# Patient Record
Sex: FEMALE | Race: WHITE | HISPANIC OR LATINO | URBAN - METROPOLITAN AREA
[De-identification: names, ages, dates, MRNs, and addresses within clinical notes are randomized per-mention and may not be internally consistent; named-entity substitution may affect disease eponyms.]

---
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
## 2023-02-15 NOTE — DISCHARGE INSTRUCTIONS
Fall Prevention in the Home   Falls can cause injuries and can affect people from all age groups. There are many simple things that you can do to make your home safe and to help prevent falls.  WHAT CAN I DO ON THE OUTSIDE OF MY HOME?  · Regularly repair the edges of walkways and driveways and fix any cracks.  · Remove high doorway thresholds.  · Trim any shrubbery on the main path into your home.  · Use bright outdoor lighting.  · Clear walkways of debris and clutter, including tools and rocks.  · Regularly check that handrails are securely fastened and in good repair. Both sides of any steps should have handrails.  · Install guardrails along the edges of any raised decks or porches.  · Have leaves, snow, and ice cleared regularly.  · Use sand or salt on walkways during winter months.  · In the garage, clean up any spills right away, including grease or oil spills.  WHAT CAN I DO IN THE BATHROOM?  · Use night lights.  · Install grab bars by the toilet and in the tub and shower. Do not use towel bars as grab bars.  · Use non-skid mats or decals on the floor of the tub or shower.  · If you need to sit down while you are in the shower, use a plastic, non-slip stool..  · Keep the floor dry. Immediately clean up any water that spills on the floor.  · Remove soap buildup in the tub or shower on a regular basis.  · Attach bath mats securely with double-sided non-slip rug tape.  · Remove throw rugs and other tripping hazards from the floor.  WHAT CAN I DO IN THE BEDROOM?  · Use night lights.  · Make sure that a bedside light is easy to reach.  · Do not use oversized bedding that drapes onto the floor.  · Have a firm chair that has side arms to use for getting dressed.  · Remove throw rugs and other tripping hazards from the floor.  WHAT CAN I DO IN THE KITCHEN?   · Clean up any spills right away.  · Avoid walking on wet floors.  · Place frequently used items in easy-to-reach places.  · If you need to reach for something  above you, use a sturdy step stool that has a grab bar.  · Keep electrical cables out of the way.  · Do not use floor polish or wax that makes floors slippery. If you have to use wax, make sure that it is non-skid floor wax.  · Remove throw rugs and other tripping hazards from the floor.  WHAT CAN I DO IN THE STAIRWAYS?  · Do not leave any items on the stairs.  · Make sure that there are handrails on both sides of the stairs. Fix handrails that are broken or loose. Make sure that handrails are as long as the stairways.  · Check any carpeting to make sure that it is firmly attached to the stairs. Fix any carpet that is loose or worn.  · Avoid having throw rugs at the top or bottom of stairways, or secure the rugs with carpet tape to prevent them from moving.  · Make sure that you have a light switch at the top of the stairs and the bottom of the stairs. If you do not have them, have them installed.  WHAT ARE SOME OTHER FALL PREVENTION TIPS?  · Wear closed-toe shoes that fit well and support your feet. Wear shoes that have rubber soles or low heels.  · When you use a stepladder, make sure that it is completely opened and that the sides are firmly locked. Have someone hold the ladder while you are using it. Do not climb a closed stepladder.  · Add color or contrast paint or tape to grab bars and handrails in your home. Place contrasting color strips on the first and last steps.  · Use mobility aids as needed, such as canes, walkers, scooters, and crutches.  · Turn on lights if it is dark. Replace any light bulbs that burn out.  · Set up furniture so that there are clear paths. Keep the furniture in the same spot.  · Fix any uneven floor surfaces.  · Choose a carpet design that does not hide the edge of steps of a stairway.  · Be aware of any and all pets.  · Review your medicines with your healthcare provider. Some medicines can cause dizziness or changes in blood pressure, which increase your risk of falling.  Talk  with your health care provider about other ways that you can decrease your risk of falls. This may include working with a physical therapist or  to improve your strength, balance, and endurance.     This information is not intended to replace advice given to you by your health care provider. Make sure you discuss any questions you have with your health care provider.     Document Released: 12/08/2003 Document Revised: 05/03/2016 Document Reviewed: 01/22/2016  [employer] Interactive Patient Education ©2016 [employer]    Head Injury, Pediatric  Your child has a head injury. Headaches and throwing up (vomiting) are common after a head injury. It should be easy to wake your child up from sleeping. Sometimes your child must stay in the hospital. Most problems happen within the first 24 hours. Side effects may occur up to 7-10 days after the injury.   WHAT ARE THE TYPES OF HEAD INJURIES?  Head injuries can be as minor as a bump. Some head injuries can be more severe. More severe head injuries include:  · A jarring injury to the brain (concussion).  · A bruise of the brain (contusion). This mean there is bleeding in the brain that can cause swelling.  · A cracked skull (skull fracture).  · Bleeding in the brain that collects, clots, and forms a bump (hematoma).  WHEN SHOULD I GET HELP FOR MY CHILD RIGHT AWAY?   · Your child is not making sense when talking.  · Your child is sleepier than normal or passes out (faints).  · Your child feels sick to his or her stomach (nauseous) or throws up (vomits) many times.  · Your child is dizzy.  · Your child has a lot of bad headaches that are not helped by medicine. Only give medicines as told by your child's doctor. Do not give your child aspirin.  · Your child has trouble using his or her legs.  · Your child has trouble walking.  · Your child's pupils (the black circles in the center of the eyes) change in size.  · Your child has clear or bloody fluid coming from his or her  nose or ears.  · Your child has problems seeing.  Call for help right away (911 in the [place] if your child shakes and is not able to control it (has seizures), is unconscious, or is unable to wake up.  HOW CAN I PREVENT MY CHILD FROM HAVING A HEAD INJURY IN THE FUTURE?  · Make sure your child wears seat belts or uses car seats.  · Make sure your child wears a helmet while bike riding and playing sports like football.  · Make sure your child stays away from dangerous activities around the house.  WHEN CAN MY CHILD RETURN TO NORMAL ACTIVITIES AND ATHLETICS?  See your doctor before letting your child do these activities. Your child should not do normal activities or play contact sports until 1 week after the following symptoms have stopped:  · Headache that does not go away.  · Dizziness.  · Poor attention.  · Confusion.  · Memory problems.  · Sickness to your stomach or throwing up.  · Tiredness.  · Fussiness.  · Bothered by bright lights or loud noises.  · Anxiousness or depression.  · Restless sleep.  MAKE SURE YOU:   · Understand these instructions.  · Will watch your child's condition.  · Will get help right away if your child is not doing well or gets worse.     This information is not intended to replace advice given to you by your health care provider. Make sure you discuss any questions you have with your health care provider.     Document Released: 06/05/2009 Document Revised: 01/08/2016 Document Reviewed: 08/25/2014  "[employer]" Interactive Patient Education ©2016 "[employer]    Laceration Care, Pediatric  A laceration is a cut that goes through all of the layers of the skin. The cut also goes into the tissue that is under the skin. Some cuts heal on their own. Others need to be closed with stitches (sutures), staples, skin adhesive strips, or wound glue. Taking care of your child's cut lowers your child's risk of infection and helps your child's cut to heal better.  HOW TO CARE FOR YOUR CHILD'S CUT  If  stitches or staples were used:  · Keep the wound clean and dry.  · If your child was given a bandage (dressing), change it at least one time per day or as told by your child's doctor. You should also change it if it gets wet or dirty.  · Keep the wound completely dry for the first 24 hours or as told by your child's doctor. After that time, your child may shower or bathe. However, make sure that the wound is not soaked in water until the stitches or staples have been removed.  · Clean the wound one time each day or as told by your child's doctor.  ¨ Wash the wound with soap and water.  ¨ Rinse the wound with water to remove all soap.  ¨ Pat the wound dry with a clean towel. Do not rub the wound.  · After cleaning the wound, put a thin layer of antibiotic ointment on it as told by your child's doctor. This ointment:  ¨ Helps to prevent infection.  ¨ Keeps the bandage from sticking to the wound.  · Have the stitches or staples removed as told by your child's doctor.  If skin adhesive strips were used:  · Keep the wound clean and dry.  · If your child was given a bandage (dressing), you should change it at least once per day or told by your child's doctor. You should also change it if it gets dirty or wet.  · Do not let the skin adhesive strips get wet. Your child may shower or bathe, but be careful to keep the wound dry.  · If the wound gets wet, pat it dry with a clean towel. Do not rub the wound.  · Skin adhesive strips fall off on their own. You can trim the strips as the wound heals. Do not take off the skin adhesive strips that are still stuck to the wound. They will fall off in time.  If wound glue was used:  · Try to keep the wound dry, but your child may briefly wet it in the shower or bath. Do not allow the wound to be soaked in water, such as by swimming.  · After your child has showered or bathed, gently pat the wound dry with a clean towel. Do not rub the wound.  · Do not allow your child to do any  activities that will make him or her sweat a lot until the skin glue has fallen off on its own.  · Do not apply liquid, cream, or ointment medicine to your child's wound while the skin glue is in place.  · If your child was given a bandage (dressing), you should change it at least once per day or as told by your child's doctor. You should also change it if it gets dirty or wet.  · If a bandage is placed over the wound, do not put tape right on top of the skin glue.  · Do not let your child pick at the glue. The skin glue usually stays in place for 5-10 days. Then, it falls off of the skin.   General Instructions  · Give medicines only as told by your child's doctor.  · To help prevent scarring, make sure to cover your child's wound with sunscreen whenever he or she is outside after stitches are removed, after adhesive strips are removed, or when glue stays in place and the wound is healed. Make sure your child wears a sunscreen of at least 30 SPF.  · If your child was prescribed an antibiotic medicine or ointment, have him or her finish all of it even if your child starts to feel better.  · Do not let your child scratch or pick at the wound.  · Keep all follow-up visits as told by your child's doctor. This is important.  · Check your child's wound every day for signs of infection. Watch for:  ¨ Redness, swelling, or pain.  ¨ Fluid, blood, or pus.  · Have your child raise (elevate) the injured area above the level of his or her heart while he or she is sitting or lying down, if possible.  GET HELP IF:  · Your child was given a tetanus shot and has any of these where the needle went in:  ¨ Swelling.  ¨ Very bad pain.  ¨ Redness.  ¨ Bleeding.  · Your child has a fever.  · A wound that was closed breaks open.  · You notice a bad smell coming from the wound.  · You notice something coming out of the wound, such as wood or glass.  · Medicine does not help your child's pain.  · Your child has any of these at the site of  the wound:  ¨ More redness.  ¨ More swelling.  ¨ More pain.  · Your child has any of these coming from the wound.  ¨ Fluid.  ¨ Blood.  ¨ Pus.  · You notice a change in the color of your child's skin near the wound.  · You need to change the bandage often due to fluid, blood, or pus coming from the wound.  · Your child has a new rash.  · Your child has numbness around the wound.  GET HELP RIGHT AWAY IF:  · Your child has very bad swelling around the wound.  · Your child's pain suddenly gets worse and is very bad.  · Your child has painful lumps near the wound or on skin that is anywhere on his or her body.  · Your child has a red streak going away from his or her wound.  · The wound is on your child's hand or foot and he or she cannot move a finger or toe like normal.  · The wound is on your child's hand or foot and you notice that his or her fingers or toes look pale or bluish.  · Your child who is younger than 3 months has a temperature of 100°F (38°C) or higher.     This information is not intended to replace advice given to you by your health care provider. Make sure you discuss any questions you have with your health care provider.     Document Released: 09/26/2009 Document Revised: 05/03/2016 Document Reviewed: 12/14/2015  [employer] Interactive Patient Education ©2016 [employer]      Staples out in 5 days   Yes

## 2023-12-31 ENCOUNTER — HOSPITAL ENCOUNTER (EMERGENCY)
Facility: MEDICAL CENTER | Age: 23
End: 2024-01-01
Attending: EMERGENCY MEDICINE
Payer: MEDICAID

## 2023-12-31 VITALS
WEIGHT: 180 LBS | TEMPERATURE: 97.2 F | RESPIRATION RATE: 17 BRPM | BODY MASS INDEX: 27.28 KG/M2 | SYSTOLIC BLOOD PRESSURE: 132 MMHG | HEIGHT: 68 IN | DIASTOLIC BLOOD PRESSURE: 99 MMHG | OXYGEN SATURATION: 96 % | HEART RATE: 100 BPM

## 2023-12-31 DIAGNOSIS — F10.920 ALCOHOLIC INTOXICATION WITHOUT COMPLICATION (HCC): ICD-10-CM

## 2023-12-31 LAB — GLUCOSE BLD STRIP.AUTO-MCNC: 136 MG/DL (ref 65–99)

## 2023-12-31 PROCEDURE — 99284 EMERGENCY DEPT VISIT MOD MDM: CPT

## 2023-12-31 PROCEDURE — 82962 GLUCOSE BLOOD TEST: CPT

## 2024-01-01 NOTE — ED TRIAGE NOTES
Chief Complaint   Patient presents with    Alcohol Intoxication     Possible ETOH      Pt found outside by herself. Pt possible alcohol intoxicated. Pt altered AAOx 1 with EMS and refused to check in ER so pt forced to get in ambulance by [facility]. Pt denies any alcohol and drug use. Pt still confused and AAOx1.

## 2024-01-01 NOTE — ED NOTES
ERP at bedside, ERP explain to the patient to call someone who can get her ride, pt agreed to call family member.

## 2024-01-01 NOTE — ED PROVIDER NOTES
"ED Provider Note    CHIEF COMPLAINT  Chief Complaint   Patient presents with    Alcohol Intoxication     Possible ETOH        EXTERNAL RECORDS REVIEWED  Inpatient Notes remote ER visit in 2018 the patient present with bodyaches    HPI/ROS  LIMITATION TO HISTORY   Select: Intoxication  OUTSIDE HISTORIAN(S):  None    Tamela Flores is a 23 y.o. female who presents to the emergency department with reported alcohol intoxication.  Patient does admit to drinking multiple alcoholic beverages while celebrating New Year's at the [facility] with her friends.  Cannot recall how she got here to the hospital.  Denies any other substance intake.  Denies any past medical history.  Denies any medications.  Denies any illicit substance.     PAST MEDICAL HISTORY       SURGICAL HISTORY  patient denies any surgical history    FAMILY HISTORY  No family history on file.    SOCIAL HISTORY  Social History     Tobacco Use    Smoking status: Never    Smokeless tobacco: Never   Substance and Sexual Activity    Alcohol use: No    Drug use: No    Sexual activity: Not on file       CURRENT MEDICATIONS  Home Medications    **Home medications have not yet been reviewed for this encounter**         ALLERGIES  No Known Allergies    PHYSICAL EXAM  VITAL SIGNS: BP (!) 132/99   Pulse 100   Temp 36.2 °C (97.2 °F) (Temporal)   Resp 17   Ht 1.727 m (5' 8\")   Wt 81.6 kg (180 lb)   SpO2 96%   BMI 27.37 kg/m²      Pulse ox interpretation: I interpret this pulse ox as normal.  Constitutional: Alert in no apparent distress.  HENT: No signs of trauma, Bilateral external ears normal, Nose normal.   Eyes: Pupils are equal and reactive  Neck: Normal range of motion, No tenderness, Supple  Cardiovascular: Regular rate and rhythm, no murmurs.   Thorax & Lungs: Normal breath sounds, No respiratory distress, No wheezing, No chest tenderness.   Skin: Warm, Dry, No erythema, No rash.   Extremities: Intact distal pulses  Musculoskeletal: Good range of motion in all " major joints. No tenderness to palpation or major deformities noted.   Neurologic: Alert , awake.  Slurred speech.  Difficulty with ambulation.        DIAGNOSTIC STUDIES / PROCEDURES      LABS  Results for orders placed or performed during the hospital encounter of 12/31/23   POCT glucose device results   Result Value Ref Range    POC Glucose, Blood 136 (H) 65 - 99 mg/dL             COURSE & MEDICAL DECISION MAKING    ED Observation Status? No; Patient does not meet criteria for ED Observation.     INITIAL ASSESSMENT, COURSE AND PLAN  Care Narrative: Patient presenting with acute alcohol intoxication.  Will check blood sugar and continue ER observational care for further improvement.     DISPOSITION AND DISCUSSIONS  I have discussed management of the patient with the following physicians and IESHA's: None    Discussion of management with other Q or appropriate source(s): None       23-year-old presenting the emerged part with excessive alcohol intake this evening.  History as above.  Blood sugar was checked.  Patient has reached further clinical sobriety.  Has obtained a ride from a family member.  Will discharge home with his family member.  She is ambulating now without any difficulty.  Tolerating p.o.'s.      FINAL DIAGNOSIS  1. Alcoholic intoxication without complication (HCC)           Electronically signed by: Peter Dalton M.D., 12/31/2023 9:30 PM

## 2024-01-01 NOTE — ED NOTES
Pt discharged home. GCS 15. Pt in possession of belongings. Pt provided discharge education and information pertaining to follow up appointments. Pt received copy of discharge instructions and verbalized understanding.     Vitals:    12/31/23 2121   BP: (!) 132/99   Pulse: 100   Resp: 17   Temp: 36.2 °C (97.2 °F)   SpO2: 96%

## 2024-01-01 NOTE — ED NOTES
Checked on bed, connected to monitor,  with unlabored respirations.   Denied any new complaints. No current needs identified.  Gurney in low position, side rail up for pt safety. Call light within reach.